# Patient Record
Sex: MALE | Race: WHITE | NOT HISPANIC OR LATINO | Employment: OTHER | URBAN - METROPOLITAN AREA
[De-identification: names, ages, dates, MRNs, and addresses within clinical notes are randomized per-mention and may not be internally consistent; named-entity substitution may affect disease eponyms.]

---

## 2018-10-23 NOTE — PATIENT DISCUSSION
MACULAR PIGMENTARY CHANGES MAY REPRESENT MYOPIC MACULOPATHY OR EARLY ARMD - DISCUSSED WITH PATIENT UV PROTECTION, GREEN/LEAFIES

## 2020-09-21 ENCOUNTER — NEW PATIENT (OUTPATIENT)
Dept: URBAN - METROPOLITAN AREA CLINIC 33 | Facility: CLINIC | Age: 75
End: 2020-09-21

## 2020-09-21 VITALS
HEART RATE: 97 BPM | HEIGHT: 69 IN | BODY MASS INDEX: 29.47 KG/M2 | DIASTOLIC BLOOD PRESSURE: 97 MMHG | SYSTOLIC BLOOD PRESSURE: 187 MMHG | WEIGHT: 199 LBS

## 2020-09-21 DIAGNOSIS — E11.9: ICD-10-CM

## 2020-09-21 DIAGNOSIS — H43.813: ICD-10-CM

## 2020-09-21 DIAGNOSIS — H34.8320: ICD-10-CM

## 2020-09-21 PROCEDURE — 92235 FLUORESCEIN ANGRPH MLTIFRAME: CPT

## 2020-09-21 PROCEDURE — 92250 FUNDUS PHOTOGRAPHY W/I&R: CPT

## 2020-09-21 PROCEDURE — 67028 INJECTION EYE DRUG: CPT

## 2020-09-21 PROCEDURE — 99204 OFFICE O/P NEW MOD 45 MIN: CPT

## 2020-09-21 ASSESSMENT — TONOMETRY
OD_IOP_MMHG: 15
OS_IOP_MMHG: 11

## 2020-09-21 ASSESSMENT — VISUAL ACUITY
OS_CC: 20/25+1
OD_CC: 20/20

## 2020-11-02 ENCOUNTER — FOLLOW UP AND POST INJECTION EVALUATION (OUTPATIENT)
Dept: URBAN - METROPOLITAN AREA CLINIC 33 | Facility: CLINIC | Age: 75
End: 2020-11-02

## 2020-11-02 DIAGNOSIS — H34.8320: ICD-10-CM

## 2020-11-02 DIAGNOSIS — E11.9: ICD-10-CM

## 2020-11-02 DIAGNOSIS — H43.813: ICD-10-CM

## 2020-11-02 PROCEDURE — 92014 COMPRE OPH EXAM EST PT 1/>: CPT

## 2020-11-02 PROCEDURE — 92250 FUNDUS PHOTOGRAPHY W/I&R: CPT

## 2020-11-02 ASSESSMENT — VISUAL ACUITY
OD_CC: 20/20-1
OS_CC: 20/25-2

## 2020-11-02 ASSESSMENT — TONOMETRY
OD_IOP_MMHG: 11
OS_IOP_MMHG: 12

## 2020-11-04 NOTE — PATIENT DISCUSSION
[facility]04/2020[id number]&nbsp;SN &nbsp; &nbsp; lcm

## 2021-01-25 ENCOUNTER — FOLLOW UP (OUTPATIENT)
Dept: URBAN - METROPOLITAN AREA CLINIC 33 | Facility: CLINIC | Age: 76
End: 2021-01-25

## 2021-01-25 VITALS — WEIGHT: 204 LBS | BODY MASS INDEX: 30.21 KG/M2 | HEIGHT: 69 IN

## 2021-01-25 DIAGNOSIS — H34.8320: ICD-10-CM

## 2021-01-25 DIAGNOSIS — E11.9: ICD-10-CM

## 2021-01-25 DIAGNOSIS — H43.813: ICD-10-CM

## 2021-01-25 PROCEDURE — 92014 COMPRE OPH EXAM EST PT 1/>: CPT

## 2021-01-25 PROCEDURE — 92134 CPTRZ OPH DX IMG PST SGM RTA: CPT

## 2021-01-25 PROCEDURE — 67028 INJECTION EYE DRUG: CPT

## 2021-01-25 PROCEDURE — 92250 FUNDUS PHOTOGRAPHY W/I&R: CPT

## 2021-01-25 ASSESSMENT — TONOMETRY
OS_IOP_MMHG: 14
OD_IOP_MMHG: 11

## 2021-01-25 ASSESSMENT — VISUAL ACUITY
OD_CC: 20/20+1
OS_CC: 20/30-2

## 2021-05-10 ENCOUNTER — FOLLOW UP AND POST INJECTION EVALUATION (OUTPATIENT)
Dept: URBAN - METROPOLITAN AREA CLINIC 33 | Facility: CLINIC | Age: 76
End: 2021-05-10

## 2021-05-10 DIAGNOSIS — H34.8320: ICD-10-CM

## 2021-05-10 DIAGNOSIS — H43.813: ICD-10-CM

## 2021-05-10 DIAGNOSIS — E11.9: ICD-10-CM

## 2021-05-10 PROCEDURE — 67028 INJECTION EYE DRUG: CPT

## 2021-05-10 PROCEDURE — 92014 COMPRE OPH EXAM EST PT 1/>: CPT

## 2021-05-10 PROCEDURE — 92250 FUNDUS PHOTOGRAPHY W/I&R: CPT

## 2021-05-10 ASSESSMENT — VISUAL ACUITY
OD_CC: 20/25+1
OS_PH: 20/30+2
OS_CC: 20/40+1

## 2021-05-10 ASSESSMENT — TONOMETRY
OS_IOP_MMHG: 13
OD_IOP_MMHG: 15

## 2021-09-13 ENCOUNTER — FOLLOW UP (OUTPATIENT)
Dept: URBAN - METROPOLITAN AREA CLINIC 33 | Facility: CLINIC | Age: 76
End: 2021-09-13

## 2021-09-13 DIAGNOSIS — H34.8320: ICD-10-CM

## 2021-09-13 DIAGNOSIS — H26.493: ICD-10-CM

## 2021-09-13 DIAGNOSIS — E11.9: ICD-10-CM

## 2021-09-13 DIAGNOSIS — H04.123: ICD-10-CM

## 2021-09-13 DIAGNOSIS — H43.813: ICD-10-CM

## 2021-09-13 PROCEDURE — 92014 COMPRE OPH EXAM EST PT 1/>: CPT

## 2021-09-13 PROCEDURE — 92134 CPTRZ OPH DX IMG PST SGM RTA: CPT

## 2021-09-13 PROCEDURE — 67210 TREATMENT OF RETINAL LESION: CPT

## 2021-09-13 ASSESSMENT — TONOMETRY
OD_IOP_MMHG: 14
OS_IOP_MMHG: 12

## 2021-09-13 ASSESSMENT — VISUAL ACUITY
OD_CC: 20/20
OS_CC: 20/25

## 2021-10-25 ENCOUNTER — POST-OP (OUTPATIENT)
Dept: URBAN - METROPOLITAN AREA CLINIC 33 | Facility: CLINIC | Age: 76
End: 2021-10-25

## 2021-10-25 DIAGNOSIS — H26.493: ICD-10-CM

## 2021-10-25 DIAGNOSIS — H04.123: ICD-10-CM

## 2021-10-25 DIAGNOSIS — E11.9: ICD-10-CM

## 2021-10-25 DIAGNOSIS — H34.8320: ICD-10-CM

## 2021-10-25 DIAGNOSIS — H43.813: ICD-10-CM

## 2021-10-25 PROCEDURE — 67028 INJECTION EYE DRUG: CPT

## 2021-10-25 PROCEDURE — 92134 CPTRZ OPH DX IMG PST SGM RTA: CPT

## 2021-10-25 PROCEDURE — 99024 POSTOP FOLLOW-UP VISIT: CPT

## 2021-10-25 ASSESSMENT — VISUAL ACUITY
OD_SC: 20/20-1
OS_SC: 20/80-2

## 2021-10-25 ASSESSMENT — TONOMETRY
OS_IOP_MMHG: 12
OD_IOP_MMHG: 12

## 2022-01-24 ENCOUNTER — CLINIC PROCEDURE ONLY (OUTPATIENT)
Dept: URBAN - METROPOLITAN AREA CLINIC 33 | Facility: CLINIC | Age: 77
End: 2022-01-24

## 2022-01-24 VITALS
HEART RATE: 70 BPM | DIASTOLIC BLOOD PRESSURE: 81 MMHG | SYSTOLIC BLOOD PRESSURE: 177 MMHG | WEIGHT: 204 LBS | BODY MASS INDEX: 30.21 KG/M2 | HEIGHT: 69 IN

## 2022-01-24 DIAGNOSIS — H34.8320: ICD-10-CM

## 2022-01-24 PROCEDURE — 92134 CPTRZ OPH DX IMG PST SGM RTA: CPT

## 2022-01-24 PROCEDURE — 67028 INJECTION EYE DRUG: CPT

## 2022-01-24 PROCEDURE — 92250 FUNDUS PHOTOGRAPHY W/I&R: CPT

## 2022-04-06 ENCOUNTER — CLINIC PROCEDURE ONLY (OUTPATIENT)
Dept: URBAN - METROPOLITAN AREA CLINIC 33 | Facility: CLINIC | Age: 77
End: 2022-04-06

## 2022-04-06 DIAGNOSIS — H34.8320: ICD-10-CM

## 2022-04-06 PROCEDURE — 92250 FUNDUS PHOTOGRAPHY W/I&R: CPT

## 2022-04-06 PROCEDURE — 92134 CPTRZ OPH DX IMG PST SGM RTA: CPT

## 2022-04-06 PROCEDURE — 67028 INJECTION EYE DRUG: CPT

## 2022-04-20 NOTE — PATIENT DISCUSSION
[facility] monitoring, AREDS2 vitamins, no smoking, green leafy vegetables discussed.

## 2022-06-29 ENCOUNTER — CLINIC PROCEDURE ONLY (OUTPATIENT)
Dept: URBAN - METROPOLITAN AREA CLINIC 33 | Facility: CLINIC | Age: 77
End: 2022-06-29

## 2022-06-29 DIAGNOSIS — H34.8320: ICD-10-CM

## 2022-06-29 DIAGNOSIS — H43.813: ICD-10-CM

## 2022-06-29 PROCEDURE — 67028 INJECTION EYE DRUG: CPT

## 2022-06-29 PROCEDURE — 92134 CPTRZ OPH DX IMG PST SGM RTA: CPT

## 2022-06-29 PROCEDURE — 92250 FUNDUS PHOTOGRAPHY W/I&R: CPT

## 2022-09-13 ENCOUNTER — FOLLOW UP (OUTPATIENT)
Dept: URBAN - METROPOLITAN AREA CLINIC 33 | Facility: CLINIC | Age: 77
End: 2022-09-13

## 2022-09-13 DIAGNOSIS — E11.9: ICD-10-CM

## 2022-09-13 DIAGNOSIS — H43.813: ICD-10-CM

## 2022-09-13 DIAGNOSIS — H34.8320: ICD-10-CM

## 2022-09-13 DIAGNOSIS — H04.123: ICD-10-CM

## 2022-09-13 PROCEDURE — 92014 COMPRE OPH EXAM EST PT 1/>: CPT

## 2022-09-13 PROCEDURE — 67028 INJECTION EYE DRUG: CPT

## 2022-09-13 PROCEDURE — 92134 CPTRZ OPH DX IMG PST SGM RTA: CPT

## 2022-09-13 ASSESSMENT — VISUAL ACUITY
OS_CC: 20/25-2
OD_CC: 20/20-2

## 2022-09-13 ASSESSMENT — TONOMETRY
OS_IOP_MMHG: 13
OD_IOP_MMHG: 14

## 2022-09-29 NOTE — PATIENT DISCUSSION
Educated patient on findings and condition. Prescribe moxifloxacin q30min OS for first 2 hours, then q2h OS rest of day/evening, then starting tomorrow decrease to q4h OS x 2 days, then QID OS until follow-up visit. Recommend frequent preservative free artificial tears. Patient good/hygienic CL wearer - additionally prescribe Valtrex 1g TID po x 7 days as precaution. Discussed no CL wear until further instruction. RTC 5 days, or sooner prn.

## 2022-09-29 NOTE — PATIENT DISCUSSION
[facility] monitoring, AREDS2 vitamins, no smoking, green leafy vegetables discussed.

## 2022-10-04 NOTE — PATIENT DISCUSSION
[facility] monitoring, AREDS2 vitamins, no smoking, green leafy vegetables discussed.

## 2022-10-04 NOTE — PATIENT DISCUSSION
Educated patient on findings. Ulcer/epi defect resolved today. Continue moxifloxacin QID through today then discontinue. Continue full course of Valtrex as prescribed po (2 more days). Ok to resume CL wear tomorrow. Recommend continuing CL-safe artificial tears BID/prn OU. Advised to call/RTC if si/sx return. Monitor.

## 2022-11-29 ENCOUNTER — CLINIC PROCEDURE ONLY (OUTPATIENT)
Dept: URBAN - METROPOLITAN AREA CLINIC 33 | Facility: CLINIC | Age: 77
End: 2022-11-29

## 2022-11-29 DIAGNOSIS — H34.8320: ICD-10-CM

## 2022-11-29 PROCEDURE — 92134 CPTRZ OPH DX IMG PST SGM RTA: CPT

## 2022-11-29 PROCEDURE — 67028 INJECTION EYE DRUG: CPT

## 2022-11-29 PROCEDURE — 92250 FUNDUS PHOTOGRAPHY W/I&R: CPT

## 2023-05-23 ENCOUNTER — FOLLOW UP (OUTPATIENT)
Dept: URBAN - METROPOLITAN AREA CLINIC 33 | Facility: CLINIC | Age: 78
End: 2023-05-23

## 2023-05-23 VITALS — HEIGHT: 69 IN | BODY MASS INDEX: 29.92 KG/M2 | WEIGHT: 202 LBS

## 2023-05-23 DIAGNOSIS — H04.123: ICD-10-CM

## 2023-05-23 DIAGNOSIS — H43.813: ICD-10-CM

## 2023-05-23 DIAGNOSIS — H34.8320: ICD-10-CM

## 2023-05-23 DIAGNOSIS — E11.9: ICD-10-CM

## 2023-05-23 PROCEDURE — 92134 CPTRZ OPH DX IMG PST SGM RTA: CPT

## 2023-05-23 PROCEDURE — 92012 INTRM OPH EXAM EST PATIENT: CPT

## 2023-05-23 ASSESSMENT — VISUAL ACUITY
OD_CC: 20/20
OS_CC: 20/25-2

## 2023-05-23 ASSESSMENT — TONOMETRY
OD_IOP_MMHG: 14
OS_IOP_MMHG: 13

## 2023-06-27 ENCOUNTER — FOLLOW UP (OUTPATIENT)
Dept: URBAN - METROPOLITAN AREA CLINIC 33 | Facility: CLINIC | Age: 78
End: 2023-06-27

## 2023-06-27 DIAGNOSIS — E11.9: ICD-10-CM

## 2023-06-27 DIAGNOSIS — H34.8320: ICD-10-CM

## 2023-06-27 DIAGNOSIS — H04.123: ICD-10-CM

## 2023-06-27 DIAGNOSIS — H43.813: ICD-10-CM

## 2023-06-27 PROCEDURE — 67028 INJECTION EYE DRUG: CPT

## 2023-06-27 PROCEDURE — 92134 CPTRZ OPH DX IMG PST SGM RTA: CPT

## 2023-06-27 PROCEDURE — 92014 COMPRE OPH EXAM EST PT 1/>: CPT

## 2023-06-27 ASSESSMENT — VISUAL ACUITY
OD_CC: 20/20
OS_CC: 20/25+2

## 2023-06-27 ASSESSMENT — TONOMETRY
OS_IOP_MMHG: 13
OD_IOP_MMHG: 14

## 2023-10-31 ENCOUNTER — CLINIC PROCEDURE ONLY (OUTPATIENT)
Dept: URBAN - METROPOLITAN AREA CLINIC 33 | Facility: CLINIC | Age: 78
End: 2023-10-31

## 2023-10-31 DIAGNOSIS — H34.8320: ICD-10-CM

## 2023-10-31 PROCEDURE — 67028 INJECTION EYE DRUG: CPT

## 2023-10-31 PROCEDURE — 92250 FUNDUS PHOTOGRAPHY W/I&R: CPT | Mod: 59

## 2023-10-31 PROCEDURE — 92134 CPTRZ OPH DX IMG PST SGM RTA: CPT

## 2024-03-05 ENCOUNTER — CLINIC PROCEDURE ONLY (OUTPATIENT)
Dept: URBAN - METROPOLITAN AREA CLINIC 33 | Facility: CLINIC | Age: 79
End: 2024-03-05

## 2024-03-05 DIAGNOSIS — H34.8320: ICD-10-CM

## 2024-03-05 PROCEDURE — 92134 CPTRZ OPH DX IMG PST SGM RTA: CPT

## 2024-03-05 PROCEDURE — 67028 INJECTION EYE DRUG: CPT

## 2024-03-05 PROCEDURE — 92250 FUNDUS PHOTOGRAPHY W/I&R: CPT

## 2024-09-24 ENCOUNTER — COMPREHENSIVE EXAM (OUTPATIENT)
Dept: URBAN - METROPOLITAN AREA CLINIC 33 | Facility: CLINIC | Age: 79
End: 2024-09-24

## 2024-09-24 DIAGNOSIS — H43.813: ICD-10-CM

## 2024-09-24 DIAGNOSIS — E11.9: ICD-10-CM

## 2024-09-24 DIAGNOSIS — H04.123: ICD-10-CM

## 2024-09-24 DIAGNOSIS — H34.8320: ICD-10-CM

## 2024-09-24 DIAGNOSIS — Z96.1: ICD-10-CM

## 2024-09-24 PROCEDURE — 92134 CPTRZ OPH DX IMG PST SGM RTA: CPT | Mod: 59

## 2024-09-24 PROCEDURE — 67028 INJECTION EYE DRUG: CPT

## 2024-09-24 PROCEDURE — 92014 COMPRE OPH EXAM EST PT 1/>: CPT | Mod: 25

## 2025-04-01 ENCOUNTER — COMPREHENSIVE EXAM (OUTPATIENT)
Age: 80
End: 2025-04-01

## 2025-04-01 VITALS
HEIGHT: 69 IN | SYSTOLIC BLOOD PRESSURE: 130 MMHG | HEART RATE: 82 BPM | DIASTOLIC BLOOD PRESSURE: 86 MMHG | WEIGHT: 191 LBS | BODY MASS INDEX: 28.29 KG/M2

## 2025-04-01 DIAGNOSIS — E11.9: ICD-10-CM

## 2025-04-01 DIAGNOSIS — H04.123: ICD-10-CM

## 2025-04-01 DIAGNOSIS — H43.813: ICD-10-CM

## 2025-04-01 DIAGNOSIS — Z96.1: ICD-10-CM

## 2025-04-01 DIAGNOSIS — H34.8320: ICD-10-CM

## 2025-04-01 PROCEDURE — 92250 FUNDUS PHOTOGRAPHY W/I&R: CPT | Mod: 59

## 2025-04-01 PROCEDURE — 92014 COMPRE OPH EXAM EST PT 1/>: CPT | Mod: 25

## 2025-04-01 PROCEDURE — 92134 CPTRZ OPH DX IMG PST SGM RTA: CPT

## 2025-04-01 PROCEDURE — 92235 FLUORESCEIN ANGRPH MLTIFRAME: CPT

## 2025-04-01 PROCEDURE — 67028 INJECTION EYE DRUG: CPT
